# Patient Record
Sex: MALE | Race: WHITE
[De-identification: names, ages, dates, MRNs, and addresses within clinical notes are randomized per-mention and may not be internally consistent; named-entity substitution may affect disease eponyms.]

---

## 2018-04-16 ENCOUNTER — HOSPITAL ENCOUNTER (OUTPATIENT)
Dept: HOSPITAL 47 - RADXRMAIN | Age: 63
Discharge: HOME | End: 2018-04-16
Attending: PODIATRIST
Payer: MEDICARE

## 2018-04-16 DIAGNOSIS — M19.072: ICD-10-CM

## 2018-04-16 DIAGNOSIS — S92.532S: Primary | ICD-10-CM

## 2018-04-16 NOTE — XR
Left foot

 

HISTORY: Trauma 2 years prior, fracture left second toe, abnormal physical exam

 

3 views of the left foot

 

No comparisons

 

Degenerative changes are present at the metatarsophalangeal joint of the first digit. Bone mineraliza
tion is mildly reduced. There is deformity second digit, marginal spurring with hypertrophic change, 
joint space loss present at the distal interphalangeal joint, there is soft tissue swelling. There ma
y be some lateral subluxation at the distal interphalangeal joint of the second digit versus lateral 
angulation. Digit is not well seen in profile digit is flexed. Question some cortical expansion of th
e middle phalanx of the second digit of the left foot. There is an enthesophyte at the insertion of t
he Achilles tendon

 

IMPRESSION: Osteoarthritis, findings may be posttraumatic in the second digit of the left foot. Limit
ations as described.